# Patient Record
Sex: MALE | NOT HISPANIC OR LATINO | Employment: FULL TIME | URBAN - METROPOLITAN AREA
[De-identification: names, ages, dates, MRNs, and addresses within clinical notes are randomized per-mention and may not be internally consistent; named-entity substitution may affect disease eponyms.]

---

## 2021-04-27 ENCOUNTER — IMMUNIZATION (OUTPATIENT)
Dept: VACCINE CLINIC | Facility: HOSPITAL | Age: 36
End: 2021-04-27

## 2021-04-27 PROCEDURE — 91300 HC SARSCOV02 VAC 30MCG/0.3ML IM: CPT | Performed by: INTERNAL MEDICINE

## 2021-04-27 PROCEDURE — 0001A: CPT | Performed by: INTERNAL MEDICINE

## 2021-05-18 ENCOUNTER — IMMUNIZATION (OUTPATIENT)
Dept: VACCINE CLINIC | Facility: HOSPITAL | Age: 36
End: 2021-05-18

## 2021-05-18 PROCEDURE — 91300 HC SARSCOV02 VAC 30MCG/0.3ML IM: CPT | Performed by: INTERNAL MEDICINE

## 2021-05-18 PROCEDURE — 0002A: CPT | Performed by: INTERNAL MEDICINE

## 2022-07-21 ENCOUNTER — HOSPITAL ENCOUNTER (EMERGENCY)
Facility: HOSPITAL | Age: 37
Discharge: HOME | End: 2022-07-21
Attending: EMERGENCY MEDICINE | Admitting: EMERGENCY MEDICINE

## 2022-07-21 VITALS — HEART RATE: 75 BPM | RESPIRATION RATE: 18 BRPM | TEMPERATURE: 97.3 F | OXYGEN SATURATION: 100 %

## 2022-07-21 DIAGNOSIS — S61.012A THUMB LACERATION, LEFT, INITIAL ENCOUNTER: Primary | ICD-10-CM

## 2022-07-21 PROCEDURE — 99282 EMERGENCY DEPT VISIT SF MDM: CPT

## 2022-07-21 RX ORDER — LIDOCAINE HYDROCHLORIDE 10 MG/ML
10 INJECTION, SOLUTION INFILTRATION; PERINEURAL ONCE
Status: DISCONTINUED | OUTPATIENT
Start: 2022-07-21 | End: 2022-07-21 | Stop reason: HOSPADM

## 2022-07-21 NOTE — ED PROVIDER NOTES
EMERGENCY DEPARTMENT ENCOUNTER    Room Number:  B01/01  Date of encounter:  7/21/2022  PCP: Provider, No Known  Historian: Patient  Full history not obtainable due to: None    HPI:  Chief Complaint: Laceration    Context: Mark Burt is a 37 y.o. male who presents to the ED c/o laceration to the left thumb between the IP joint and the MCP joint.  The accident occurred around 4:30 PM when he was at work.  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  He was able to control the bleeding relatively quickly with direct pressure.  Admits to some tingling in the thumb but has full range of motion and sensation.  Denies use of blood thinners.      MEDICAL RECORD REVIEW:    Upon review of the medical record it appears the patient was evaluated at an urgent care center today prior to coming here.  He was diagnosed with a laceration of the left thumb and sent for further evaluation.      PAST MEDICAL HISTORY    Active Ambulatory Problems     Diagnosis Date Noted   • No Active Ambulatory Problems     Resolved Ambulatory Problems     Diagnosis Date Noted   • No Resolved Ambulatory Problems     No Additional Past Medical History         PAST SURGICAL HISTORY  No past surgical history on file.      FAMILY HISTORY  No family history on file.      SOCIAL HISTORY  Social History     Socioeconomic History   • Marital status: Unknown         ALLERGIES  Patient has no known allergies.        REVIEW OF SYSTEMS  Review of Systems   Constitutional: Negative for chills and fever.   HENT: Negative for congestion.    Eyes: Negative for visual disturbance.   Respiratory: Negative for shortness of breath.    Cardiovascular: Negative for chest pain.   Gastrointestinal: Negative for abdominal pain, nausea and vomiting.   Genitourinary: Negative for difficulty urinating.   Musculoskeletal: Negative for gait problem.   Skin: Positive for wound.   Neurological: Negative for  syncope.   Psychiatric/Behavioral: Negative for suicidal ideas.      All systems reviewed and marked as negative except as listed in HPI       PHYSICAL EXAM    I have reviewed the triage vital signs and nursing notes.    ED Triage Vitals [07/21/22 1808]   Temp Heart Rate Resp BP SpO2   97.3 °F (36.3 °C) 75 18 -- 100 %      Temp src Heart Rate Source Patient Position BP Location FiO2 (%)   Tympanic Monitor -- -- --       Physical Exam  Constitutional:       General: He is not in acute distress.     Appearance: He is not ill-appearing.   HENT:      Head: Normocephalic and atraumatic.   Eyes:      General: No scleral icterus.  Cardiovascular:      Rate and Rhythm: Normal rate and regular rhythm.   Pulmonary:      Effort: Pulmonary effort is normal.      Breath sounds: Normal breath sounds.   Musculoskeletal:      Cervical back: Normal range of motion.   Skin:     General: Skin is warm and dry.      Comments: There is a 1.5 cm crescent-shaped laceration to the radial/palmar aspect of the left thumb between the IP and the MCP joint.  Bleeding controlled at this time.   Neurological:      Mental Status: He is alert and oriented to person, place, and time.   Psychiatric:         Mood and Affect: Mood normal.         Speech: Speech normal.         Behavior: Behavior normal.         Vital signs and nursing notes reviewed.            LAB RESULTS  No results found for this or any previous visit (from the past 24 hour(s)).    Ordered the above labs and independently reviewed the results.        RADIOLOGY  No Radiology Exams Resulted Within Past 24 Hours    I ordered the above noted radiological studies. Independently reviewed by me and discussed with radiologist.  See dictation above for official radiology interpretation.      PROCEDURES    Laceration Repair    Date/Time: 7/21/2022 6:46 PM  Performed by: Sherman Cantu PA  Authorized by: Charles Oates MD     Consent:     Consent obtained:  Verbal    Consent given by:   Patient    Risks, benefits, and alternatives were discussed: yes      Risks discussed:  Infection, poor cosmetic result and poor wound healing  Universal protocol:     Patient identity confirmed:  Verbally with patient  Anesthesia:     Anesthesia method:  Local infiltration    Local anesthetic:  Lidocaine 1% w/o epi  Laceration details:     Location:  Finger    Finger location:  L thumb    Length (cm):  1.8  Exploration:     Hemostasis achieved with:  Direct pressure    Wound exploration: wound explored through full range of motion and entire depth of wound visualized      Wound extent: no nerve damage noted and no tendon damage noted      Contaminated: no    Treatment:     Area cleansed with:  Chlorhexidine and saline    Amount of cleaning:  Extensive    Irrigation solution:  Sterile saline    Irrigation volume:  500    Irrigation method:  Pressure wash    Visualized foreign bodies/material removed: no      Debridement:  Minimal  Skin repair:     Repair method:  Sutures    Suture size:  5-0    Suture material:  Nylon    Number of sutures:  4  Approximation:     Approximation:  Close  Repair type:     Repair type:  Complex  Post-procedure details:     Procedure completion:  Tolerated well, no immediate complications            MEDICATIONS GIVEN IN ER    Medications   lidocaine (XYLOCAINE) 1 % injection 10 mL (has no administration in time range)   Tetanus-Diphth-Acell Pertussis (BOOSTRIX) injection 0.5 mL (has no administration in time range)         PROGRESS, DATA ANALYSIS, CONSULTS, AND MEDICAL DECISION MAKING    All labs have been independently reviewed by me.  All radiology studies have been reviewed by me.   EKG's independently reviewed by me.  Discussion below represents my analysis of pertinent findings related to patient's condition, differential diagnosis, treatment plan and final disposition.    DIFFERENTIAL DIAGNOSIS INCLUDE BUT NOT LIMITED TO:     Laceration, foreign body, tendon damage, fracture          AS OF 18:44 EDT VITALS:    BP -    HR - 75  TEMP - 97.3 °F (36.3 °C) (Tympanic)  02 SATS - 100%    1846 I rechecked the patient.  I discussed the patient's diagnosis, and plan for discharge.  A repeat exam reveals no new worrisome changes from my initial exam findings.  The patient understands that the fact that they are being discharged does not denote that nothing is abnormal, it indicates that no clinical emergency is present and that they must follow-up as directed in order to properly maintain their health.  Follow-up instructions (specifically listed below) and return to ER precautions were given at this time.  I specifically instructed the patient to follow-up with their PCP.  The patient understands and agrees with the plan, and is ready for discharge.  All questions answered.        DIAGNOSIS  Final diagnoses:   Thumb laceration, left, initial encounter         DISPOSITION  D/c    Pt masked in first look. I wore a surgical mask throughout my encounters with the pt. I performed hand hygiene on entry into the pt room and upon exit.     Dictated utilizing Dragon dictation      Sherman Cantu PA  07/21/22 [id number]

## 2022-07-21 NOTE — ED NOTES
Placed a finger splint on pt's left thumb and wrapped the splint in Coban, pt instructed on how to wrap the thumb and take care of the splint. Pt provided with extra Coban to take home in case the dressing needs changed. Pt has no further needs at this time.

## 2022-07-21 NOTE — ED PROVIDER NOTES
MD ATTESTATION NOTE  I wore full protective equipment throughout this patient encounter including an N95 face mask, googles, gown and gloves. Hand hygiene was performed before donning protective equipment and after removal when leaving the room.    The RODRIGO and I have discussed this patient's history, physical exam, and treatment plan. I have reviewed the documentation and personally had a face to face interaction with the patient. I affirm the RODRIGO documentation and agree with their diagnostics, findings, treatment, plan, and disposition.    I provided a substantive portion of the care of this patient.  I personally performed the physical exam, in its entirety.  The attached note describes my personal findings.    Mark Burt is a 37 y.o. male who presents to the ED c/o left thumb laceration.  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  Patient was cutting things with a serrated knife when he sustained a laceration of his left thumb.  Patient denies any other injury, did have some bleeding, bleeding since resolved.  Patient denies any weakness or numbness.  Patient reports that he was at baseline prior to injury.  Unsure of last tetanus.    On exam:  General: NAD.  Head: NCAT.  ENT: nares patent, no scleral icterus  Neck: Supple, trachea midline.  Cardiac: regular rate and rhythm.  Lungs: normal effort.  Abdomen: Soft, NTTP.   Extremities: Moves all extremities well, no peripheral edema.  Left hand: Patient has curvilinear laceration on distal left thumb, no nailbed involvement, laceration superficial, thumb has full flexion extension, sensation intact light touch, brisk cap refill.  Hand is otherwise normal in appearance.  Neuro: alert, MAEW, follows commands  Psych: calm, cooperative  Skin: Warm, dry.    Medical Decision Making:  After the initial H&P, I discussed pertinent information from history and physical exam with patient/family.  Discussed differential diagnosis.  Discussed plan  for ED evaluation/work-up/treatment.  All questions answered.  Patient/family is agreeable with plan.         Diagnosis  Final diagnoses:   Thumb laceration, left, initial encounter        Charles Oates MD  07/21/22 2889

## 2022-07-21 NOTE — DISCHARGE INSTRUCTIONS
Rest, ice, elevate to reduce swelling.  SUTURES TO BE REMOVED IN 10-14 DAYS BY YOUR PRIMARY CARE PROVIDER.  Keep dressing in place and keep area clean and dry for 24-48 hours. After this time, remove dressing, wash with antibacterial soap and water 2-3 times per day and apply antibiotic ointment daily.   Keep covered with gauze or bandaid.  Follow up with primary care for further management and to have your blood pressure rechecked.   Return to ER for excessive pain, swelling, numbness/tingling, fever, chills, weakness, redness, red streaking, drainage, bleeding, decreased sensation, or other worsening/concerning symptoms.